# Patient Record
Sex: FEMALE | Race: OTHER | Employment: UNEMPLOYED | URBAN - METROPOLITAN AREA
[De-identification: names, ages, dates, MRNs, and addresses within clinical notes are randomized per-mention and may not be internally consistent; named-entity substitution may affect disease eponyms.]

---

## 2021-02-09 ENCOUNTER — HOSPITAL ENCOUNTER (OUTPATIENT)
Dept: LAB | Age: 36
Discharge: HOME | End: 2021-02-09

## 2021-02-09 PROCEDURE — 87624 HPV HI-RISK TYP POOLED RSLT: CPT

## 2021-02-09 PROCEDURE — 88142 CYTOPATH C/V THIN LAYER: CPT

## 2021-02-11 ENCOUNTER — APPOINTMENT (OUTPATIENT)
Dept: PHYSICAL THERAPY | Age: 36
End: 2021-02-11

## 2021-02-16 ENCOUNTER — HOSPITAL ENCOUNTER (OUTPATIENT)
Dept: PHYSICAL THERAPY | Age: 36
Discharge: HOME | End: 2021-02-16
Payer: SUBSIDIZED

## 2021-02-16 PROCEDURE — 97162 PT EVAL MOD COMPLEX 30 MIN: CPT | Performed by: PHYSICAL THERAPIST

## 2021-02-16 PROCEDURE — 97110 THERAPEUTIC EXERCISES: CPT | Performed by: PHYSICAL THERAPIST

## 2021-02-16 PROCEDURE — 97535 SELF CARE MNGMENT TRAINING: CPT | Performed by: PHYSICAL THERAPIST

## 2021-02-16 NOTE — PROGRESS NOTES
PT INITIAL EVALUATION NOTE 2-15    Patient Name: Duane Binning  Date:2021  : 1985  [x]  Patient  Verified  Payor: [address] [place] [address] / Plan: [facility] / Product Type: Jagruti Diaz /    In time:135 Out time:230  Total Treatment Time (min): 55  Visit #: 1     Treatment Area: Pelvic floor dysfunction [M62.89]    SUBJECTIVE  Pain Level (0-10 scale): 0  Any medication changes, allergies to medications, adverse drug reactions, diagnosis change, or new procedure performed?: [] No    [x] Yes (see summary sheet for update)  Subjective:     Patient arrived at clinic with , Osito Conteh. She reports that she has always had a small bladder and goes frequently. Over the past 4-5 months this has become significantly worse. She denies any medication or health changes, no Hx of surgery or pregnancy. She reports that she has constant and urge and will visit every bathroom she sees. She has started limited her water intake to help her urge. She does not drink caffeine or fizzy drinks and limited alcohol. Wakes 1-2x/night to void. Denies pain or difficulty with BM. She reports that she has started to leak (small amounts) on the way to the bathroom.      PLOF: walks, light exercises  Mechanism of Injury: gradual  Previous Treatment/Compliance: n/a  PMHx/Surgical Hx: n/a  Work Hx:   Living Situation: lives with friend  Pt Goals: to reduce urge and leak  Barriers: language  Motivation: yes  Substance use: non   FABQ Score: -  Cognition: A & O x 4        OBJECTIVE/EXAMINATION    External Pelvic Exam  Non tender through external pelvic clock  No POP at rest or with sustained valsalva  Active contraction: present  Active relaxation: present  Involuntary relaxation: NP  Internal Vaginal Exam:  Layer 1: wnl  Layer 2/3: inc tissue turgor throughout  Bladder neck mobility:    PERFECT SCORE CHART  P =  Power (Laycock Scale Grade 0-5) 4/5  E =  Endurance (How long pt holds max contraction) 2 sec  R =  Repetitions (How many times the repeats holds) 4x  F =  Fast Twitch (How many 1 second contractions in 10 seconds) poor coordination relaxing  E =  Elevation (Lift of post vaginal wall toward pubic bone) present  C =  Coordinated cocontraction of transverse abdominus absent  T = Timing (squeeze and lift with cough) absent        10 min Therapeutic Exercise:  [x] See flow sheet :urge suppression drills   Rationale: increase ROM, increase strength and improve coordination to improve the patients ability to perform ADLs      15 min Self Care/Home Management:  [x]  See flow sheet :bladder schedule, bladder irritants, body scans   Rationale: increase ROM, increase strength, improve coordination and increase proprioception  to improve the patients ability to reduce UI              With   [] TE   [] TA   [] Neuro   [] SC   [] other: Patient Education: [x] Review HEP    [] Progressed/Changed HEP based on:   [] positioning   [] body mechanics   [] transfers   [] heat/ice application    [] other:        Other Objective/Functional Measures:see above    Pain Level (0-10 scale) post treatment: 0      ASSESSMENT:      [x]  See Plan of [address] [place] [address], PT 2/16/2021

## 2021-02-16 NOTE — PROGRESS NOTES
Physical Therapy at [facility],   a part of  [facility]  [address] [place] Nayeli Kaiser  Phone: [phone]  Fax: [phone]    Plan of Care/ Statement of Necessity for Physical Therapy Services 2-15    Patient name: Day Arguello  : 1985  Provider#: [id number]  Referral source: Pascale Owens MD      Medical/Treatment Diagnosis: Pelvic floor dysfunction [M62.89]     Prior Hospitalization: see medical history     Comorbidities: n/a  Prior Level of Function: see eval  Medications: Verified on Patient Summary List    Start of Care: 2021      Onset Date: 4 months       The Plan of Care and following information is based on the information from the initial evaluation. Assessment/ key information: Patient referred to pelvic PT for pelvic floor dysfunction. She presents with signs and symptoms associated with stress and urge incontinence. She presents with strength deficits, overactivity, poor coordination and awareness of the levator ani. She will benefit from skilled PT to address these problems so that she can perform all ADLs at [facility]. Evaluation Complexity History MEDIUM  Complexity : 1-2 comorbidities / personal factors will impact the outcome/ POC ; Examination MEDIUM Complexity : 3 Standardized tests and measures addressing body structure, function, activity limitation and / or participation in recreation  ;Presentation MEDIUM Complexity : Evolving with changing characteristics  ; Clinical Decision Making MEDIUM Complexity : FOTO score of 26-74  Overall Complexity Rating: MEDIUM    Problem List: pain affecting function, decrease ROM, decrease strength, impaired gait/ balance, decrease ADL/ functional abilitiies, decrease activity tolerance and decrease flexibility/ joint mobility   Treatment Plan may include any combination of the following: Therapeutic exercise, Therapeutic activities, Neuromuscular re-education, Physical agent/modality, Gait/balance training, Manual therapy, Patient education, Self Care training, Functional mobility training and Home safety training  Patient / Family readiness to learn indicated by: asking questions, trying to perform skills and interest  Persons(s) to be included in education: patient (P)  Barriers to Learning/Limitations: None  Patient Goal (s): to reduce leakage  Patient Self Reported Health Status: good  Rehabilitation Potential: good    Short Term Goals: To be accomplished in 6 weeks:  Patient will be independent with a progressive home exercise program    Patient will demonstrate & utilized pelvic floor ms protection techniques   Patient will demonstrate improved PFM strength to 3+/5 bilaterally in order to decrease UI symptoms   Patient will be independent with urge suppression techniques, bladder irritant elmination   Patient will complete a 72 hour bladder diary for analysis  Patient will be independent with progressive body scan relaxation program     Long Term Goals: To be accomplished in 12 weeks:  Patient will demonstrate 4/5 PFM strength bilaterally in order to eliminate UI symptoms. Patient will demonstrate 10 second PFM holds at 4/5 in order to elminate UI symptoms. Patient will have no loss of urine with cough/sneeze. Patient will have no loss of urine with urge triggers (key in door, pulling pants down)    Frequency / Duration: Patient to be seen 1-2 times per week for 12 weeks. Patient/ Caregiver education and instruction: self care, activity modification and exercises    [x]  Plan of care has been reviewed with LINDA Ibanez, PT 2/16/2021     ________________________________________________________________________    I certify that the above Therapy Services are being furnished while the patient is under my care. I agree with the treatment plan and certify that this therapy is necessary.     Physician's Signature:____________________ Date:____________Time: _________      Perry Downs MD

## 2021-03-16 ENCOUNTER — HOSPITAL ENCOUNTER (OUTPATIENT)
Dept: PHYSICAL THERAPY | Age: 36
End: 2021-03-16

## 2021-03-30 NOTE — PROGRESS NOTES
Physical Therapy at [facility],   a part of  [facility]  [address] [facility] Nayeli Kaiser  Phone: [phone]  Fax: [phone]    Discharge Summary  2-15    Patient name: Joel Bailey  : 1985  Provider#: [id number]  Referral source: Ciera Moss MD      Medical/Treatment Diagnosis: Pelvic floor dysfunction [M62.89]     Prior Hospitalization: see medical history     Comorbidities: See Plan of Care  Prior Level of Function:See Plan of Care  Medications: Verified on Patient Summary List    Start of Care: 2021      Onset Date:2 months   Visits from Start of Care: 1     Missed Visits: 1  Reporting Period :  to 2021      ASSESSMENT/SUMMARY OF CARE: Patient seen for evaluation of PFMD.  She did not return for follow-up and therefore unable to assess goals at this time.         RECOMMENDATIONS:  [x]Discontinue therapy: []Patient has reached or is progressing toward set goals      []Patient is non-compliant or has abdicated      []Due to lack of appreciable progress towards set goals      []Other    Ricardo Villegas, PT 3/30/2021